# Patient Record
Sex: MALE | Race: WHITE | NOT HISPANIC OR LATINO | URBAN - METROPOLITAN AREA
[De-identification: names, ages, dates, MRNs, and addresses within clinical notes are randomized per-mention and may not be internally consistent; named-entity substitution may affect disease eponyms.]

---

## 2022-10-24 VITALS
HEART RATE: 81 BPM | SYSTOLIC BLOOD PRESSURE: 145 MMHG | DIASTOLIC BLOOD PRESSURE: 81 MMHG | RESPIRATION RATE: 18 BRPM | TEMPERATURE: 98 F | OXYGEN SATURATION: 94 %

## 2022-10-24 LAB
ALBUMIN SERPL ELPH-MCNC: 3.6 G/DL — SIGNIFICANT CHANGE UP (ref 3.3–5)
ALP SERPL-CCNC: 137 U/L — HIGH (ref 40–120)
ALT FLD-CCNC: 31 U/L — SIGNIFICANT CHANGE UP (ref 10–45)
ANION GAP SERPL CALC-SCNC: 16 MMOL/L — SIGNIFICANT CHANGE UP (ref 5–17)
APTT BLD: 29.1 SEC — SIGNIFICANT CHANGE UP (ref 27.5–35.5)
AST SERPL-CCNC: 38 U/L — SIGNIFICANT CHANGE UP (ref 10–40)
BASOPHILS # BLD AUTO: 0.01 K/UL — SIGNIFICANT CHANGE UP (ref 0–0.2)
BASOPHILS NFR BLD AUTO: 0.1 % — SIGNIFICANT CHANGE UP (ref 0–2)
BILIRUB SERPL-MCNC: 2 MG/DL — HIGH (ref 0.2–1.2)
BUN SERPL-MCNC: 15 MG/DL — SIGNIFICANT CHANGE UP (ref 7–23)
CALCIUM SERPL-MCNC: 7.8 MG/DL — LOW (ref 8.4–10.5)
CHLORIDE SERPL-SCNC: 105 MMOL/L — SIGNIFICANT CHANGE UP (ref 96–108)
CO2 SERPL-SCNC: 21 MMOL/L — LOW (ref 22–31)
CREAT SERPL-MCNC: 0.97 MG/DL — SIGNIFICANT CHANGE UP (ref 0.5–1.3)
EGFR: 73 ML/MIN/1.73M2 — SIGNIFICANT CHANGE UP
EOSINOPHIL # BLD AUTO: 0.01 K/UL — SIGNIFICANT CHANGE UP (ref 0–0.5)
EOSINOPHIL NFR BLD AUTO: 0.1 % — SIGNIFICANT CHANGE UP (ref 0–6)
GLUCOSE SERPL-MCNC: 142 MG/DL — HIGH (ref 70–99)
HCT VFR BLD CALC: 37.4 % — LOW (ref 39–50)
HGB BLD-MCNC: 12.5 G/DL — LOW (ref 13–17)
IMM GRANULOCYTES NFR BLD AUTO: 0.3 % — SIGNIFICANT CHANGE UP (ref 0–0.9)
INR BLD: 1.28 — HIGH (ref 0.88–1.16)
LACTATE SERPL-SCNC: 3.5 MMOL/L — HIGH (ref 0.5–2)
LYMPHOCYTES # BLD AUTO: 1.19 K/UL — SIGNIFICANT CHANGE UP (ref 1–3.3)
LYMPHOCYTES # BLD AUTO: 8.2 % — LOW (ref 13–44)
MCHC RBC-ENTMCNC: 29.3 PG — SIGNIFICANT CHANGE UP (ref 27–34)
MCHC RBC-ENTMCNC: 33.4 GM/DL — SIGNIFICANT CHANGE UP (ref 32–36)
MCV RBC AUTO: 87.6 FL — SIGNIFICANT CHANGE UP (ref 80–100)
MONOCYTES # BLD AUTO: 0.58 K/UL — SIGNIFICANT CHANGE UP (ref 0–0.9)
MONOCYTES NFR BLD AUTO: 4 % — SIGNIFICANT CHANGE UP (ref 2–14)
NEUTROPHILS # BLD AUTO: 12.67 K/UL — HIGH (ref 1.8–7.4)
NEUTROPHILS NFR BLD AUTO: 87.3 % — HIGH (ref 43–77)
NRBC # BLD: 0 /100 WBCS — SIGNIFICANT CHANGE UP (ref 0–0)
NT-PROBNP SERPL-SCNC: HIGH PG/ML (ref 0–300)
PLATELET # BLD AUTO: 215 K/UL — SIGNIFICANT CHANGE UP (ref 150–400)
POTASSIUM SERPL-MCNC: 3.4 MMOL/L — LOW (ref 3.5–5.3)
POTASSIUM SERPL-SCNC: 3.4 MMOL/L — LOW (ref 3.5–5.3)
PROT SERPL-MCNC: 6.6 G/DL — SIGNIFICANT CHANGE UP (ref 6–8.3)
PROTHROM AB SERPL-ACNC: 15.3 SEC — HIGH (ref 10.5–13.4)
RBC # BLD: 4.27 M/UL — SIGNIFICANT CHANGE UP (ref 4.2–5.8)
RBC # FLD: 16.1 % — HIGH (ref 10.3–14.5)
SARS-COV-2 RNA SPEC QL NAA+PROBE: NEGATIVE — SIGNIFICANT CHANGE UP
SODIUM SERPL-SCNC: 142 MMOL/L — SIGNIFICANT CHANGE UP (ref 135–145)
TROPONIN T SERPL-MCNC: 0.04 NG/ML — CRITICAL HIGH (ref 0–0.01)
WBC # BLD: 14.51 K/UL — HIGH (ref 3.8–10.5)
WBC # FLD AUTO: 14.51 K/UL — HIGH (ref 3.8–10.5)

## 2022-10-24 PROCEDURE — 70450 CT HEAD/BRAIN W/O DYE: CPT | Mod: 26,MC

## 2022-10-24 PROCEDURE — 71045 X-RAY EXAM CHEST 1 VIEW: CPT | Mod: 26

## 2022-10-24 PROCEDURE — 93010 ELECTROCARDIOGRAM REPORT: CPT

## 2022-10-24 PROCEDURE — 99291 CRITICAL CARE FIRST HOUR: CPT

## 2022-10-24 RX ORDER — AZITHROMYCIN 500 MG/1
500 TABLET, FILM COATED ORAL ONCE
Refills: 0 | Status: COMPLETED | OUTPATIENT
Start: 2022-10-24 | End: 2022-10-24

## 2022-10-24 RX ORDER — CEFTRIAXONE 500 MG/1
1000 INJECTION, POWDER, FOR SOLUTION INTRAMUSCULAR; INTRAVENOUS ONCE
Refills: 0 | Status: COMPLETED | OUTPATIENT
Start: 2022-10-24 | End: 2022-10-24

## 2022-10-24 RX ORDER — SODIUM CHLORIDE 9 MG/ML
1000 INJECTION INTRAMUSCULAR; INTRAVENOUS; SUBCUTANEOUS ONCE
Refills: 0 | Status: DISCONTINUED | OUTPATIENT
Start: 2022-10-24 | End: 2022-10-24

## 2022-10-24 RX ORDER — SODIUM CHLORIDE 9 MG/ML
1000 INJECTION INTRAMUSCULAR; INTRAVENOUS; SUBCUTANEOUS ONCE
Refills: 0 | Status: COMPLETED | OUTPATIENT
Start: 2022-10-24 | End: 2022-10-24

## 2022-10-24 RX ADMIN — SODIUM CHLORIDE 1000 MILLILITER(S): 9 INJECTION INTRAMUSCULAR; INTRAVENOUS; SUBCUTANEOUS at 23:14

## 2022-10-24 RX ADMIN — CEFTRIAXONE 100 MILLIGRAM(S): 500 INJECTION, POWDER, FOR SOLUTION INTRAMUSCULAR; INTRAVENOUS at 23:14

## 2022-10-24 NOTE — ED ADULT TRIAGE NOTE - ARRIVAL INFO ADDITIONAL COMMENTS
Pt to ED c/o AMS and possible syncope. Per pt's wife at bedside the patient was in bed "and he would not answer me then he was confused" Unknown duration. Pt A&OX3 at this time. Per EMS pt was hypoxic on arrival to home o2 sat in "low 90s" and placed on 4L nasal canula. Denies head trauma.

## 2022-10-24 NOTE — ED ADULT NURSE NOTE - OBJECTIVE STATEMENT
89 y/o M with pmhx HLD, HTN, and recent PNA one month ago requiring 5 day admission to [place] for IV abx presents to the ED via EMS for sudden onset worsening AMS just PTA. "He goes to bed early, when all of a sudden he started screaming. I ran into his room and and he wouldn't respond to me, was screaming, and was holding his breath and breathing weird. I called our doctor and they told us to call 911." Per EMS, pt 90% on RA and placed on 2LNC. On exam, pt not answering orientation questions and is 89 y/o M with pmhx HLD, HTN, and recent PNA one month ago requiring 5 day admission to [place] for IV abx presents to the ED via EMS for sudden onset worsening AMS just PTA. "He goes to bed early, when all of a sudden he started screaming. I ran into his room and and he wouldn't respond to me, was screaming, and was holding his breath and breathing weird. I called our doctor and they told us to call 911." Per EMS, pt 90% on RA and placed on 2LNC. On exam, pt not answering orientation questions and is saying only "I want to throw up. I want to go home." Respirations shallow. Lung sounds diminished throughout. Tachypnea. No retractions or accessory muscle use. VSS, afebrile. Radial pulses 2+ and equal bilaterally. DP pulses 1+ and equal bilaterally. Delayed cap refill to toes. No edema. Pallor. Small incised abscess under chin, no signs of infection, dressing clean, dry, and intact. Pt placed on the cardiac monitor. PIV placed. Labs sent. Awaiting CT and XR.

## 2022-10-24 NOTE — ED ADULT NURSE REASSESSMENT NOTE - NS ED NURSE REASSESS COMMENT FT1
Pt sterile straight cathed for purulent urine. Urine sent to lab. Blood cultures drawn. IV abx initiated after specimen collection.

## 2022-10-24 NOTE — ED ADULT NURSE NOTE - THROAT
external neck incised abscess is clean, dry intact dressing, wound bed with fat, no signs of infection

## 2022-10-25 ENCOUNTER — INPATIENT (INPATIENT)
Facility: HOSPITAL | Age: 87
LOS: 0 days | Discharge: ANOTHER HOSPITAL | DRG: 312 | End: 2022-10-25
Attending: INTERNAL MEDICINE | Admitting: INTERNAL MEDICINE
Payer: MEDICARE

## 2022-10-25 VITALS
OXYGEN SATURATION: 97 % | SYSTOLIC BLOOD PRESSURE: 155 MMHG | TEMPERATURE: 99 F | RESPIRATION RATE: 18 BRPM | DIASTOLIC BLOOD PRESSURE: 73 MMHG | HEART RATE: 81 BPM

## 2022-10-25 DIAGNOSIS — K08.409 PARTIAL LOSS OF TEETH, UNSPECIFIED CAUSE, UNSPECIFIED CLASS: Chronic | ICD-10-CM

## 2022-10-25 DIAGNOSIS — R79.89 OTHER SPECIFIED ABNORMAL FINDINGS OF BLOOD CHEMISTRY: ICD-10-CM

## 2022-10-25 DIAGNOSIS — R74.8 ABNORMAL LEVELS OF OTHER SERUM ENZYMES: ICD-10-CM

## 2022-10-25 DIAGNOSIS — R55 SYNCOPE AND COLLAPSE: ICD-10-CM

## 2022-10-25 DIAGNOSIS — N39.0 URINARY TRACT INFECTION, SITE NOT SPECIFIED: ICD-10-CM

## 2022-10-25 DIAGNOSIS — I48.91 UNSPECIFIED ATRIAL FIBRILLATION: ICD-10-CM

## 2022-10-25 DIAGNOSIS — Z98.890 OTHER SPECIFIED POSTPROCEDURAL STATES: Chronic | ICD-10-CM

## 2022-10-25 DIAGNOSIS — R77.8 OTHER SPECIFIED ABNORMALITIES OF PLASMA PROTEINS: ICD-10-CM

## 2022-10-25 DIAGNOSIS — J18.1 LOBAR PNEUMONIA, UNSPECIFIED ORGANISM: ICD-10-CM

## 2022-10-25 LAB
APPEARANCE UR: CLEAR — SIGNIFICANT CHANGE UP
APTT BLD: 136.2 SEC — CRITICAL HIGH (ref 27.5–35.5)
BACTERIA # UR AUTO: PRESENT /HPF
BILIRUB UR-MCNC: ABNORMAL
CK MB CFR SERPL CALC: 7 NG/ML — HIGH (ref 0–6.7)
CK SERPL-CCNC: 557 U/L — HIGH (ref 30–200)
COLOR SPEC: YELLOW — SIGNIFICANT CHANGE UP
DIFF PNL FLD: ABNORMAL
EPI CELLS # UR: SIGNIFICANT CHANGE UP /HPF (ref 0–5)
GLUCOSE UR QL: NEGATIVE — SIGNIFICANT CHANGE UP
HCT VFR BLD CALC: 39 % — SIGNIFICANT CHANGE UP (ref 39–50)
HGB BLD-MCNC: 12.5 G/DL — LOW (ref 13–17)
KETONES UR-MCNC: 15 MG/DL
LACTATE SERPL-SCNC: 1.6 MMOL/L — SIGNIFICANT CHANGE UP (ref 0.5–2)
LEUKOCYTE ESTERASE UR-ACNC: NEGATIVE — SIGNIFICANT CHANGE UP
MCHC RBC-ENTMCNC: 29 PG — SIGNIFICANT CHANGE UP (ref 27–34)
MCHC RBC-ENTMCNC: 32.1 GM/DL — SIGNIFICANT CHANGE UP (ref 32–36)
MCV RBC AUTO: 90.5 FL — SIGNIFICANT CHANGE UP (ref 80–100)
NITRITE UR-MCNC: POSITIVE
NRBC # BLD: 0 /100 WBCS — SIGNIFICANT CHANGE UP (ref 0–0)
PH UR: 6 — SIGNIFICANT CHANGE UP (ref 5–8)
PLATELET # BLD AUTO: 175 K/UL — SIGNIFICANT CHANGE UP (ref 150–400)
PROT UR-MCNC: 100 MG/DL
RBC # BLD: 4.31 M/UL — SIGNIFICANT CHANGE UP (ref 4.2–5.8)
RBC # FLD: 16.2 % — HIGH (ref 10.3–14.5)
RBC CASTS # UR COMP ASSIST: > 10 /HPF
SP GR SPEC: 1.02 — SIGNIFICANT CHANGE UP (ref 1–1.03)
TROPONIN T SERPL-MCNC: 0.09 NG/ML — CRITICAL HIGH (ref 0–0.01)
TROPONIN T SERPL-MCNC: 0.11 NG/ML — CRITICAL HIGH (ref 0–0.01)
UROBILINOGEN FLD QL: 1 E.U./DL — SIGNIFICANT CHANGE UP
WBC # BLD: 9.65 K/UL — SIGNIFICANT CHANGE UP (ref 3.8–10.5)
WBC # FLD AUTO: 9.65 K/UL — SIGNIFICANT CHANGE UP (ref 3.8–10.5)
WBC UR QL: ABNORMAL /HPF

## 2022-10-25 PROCEDURE — 83605 ASSAY OF LACTIC ACID: CPT

## 2022-10-25 PROCEDURE — 71045 X-RAY EXAM CHEST 1 VIEW: CPT

## 2022-10-25 PROCEDURE — 83880 ASSAY OF NATRIURETIC PEPTIDE: CPT

## 2022-10-25 PROCEDURE — 82550 ASSAY OF CK (CPK): CPT

## 2022-10-25 PROCEDURE — 87635 SARS-COV-2 COVID-19 AMP PRB: CPT

## 2022-10-25 PROCEDURE — 99285 EMERGENCY DEPT VISIT HI MDM: CPT

## 2022-10-25 PROCEDURE — 87040 BLOOD CULTURE FOR BACTERIA: CPT

## 2022-10-25 PROCEDURE — 81001 URINALYSIS AUTO W/SCOPE: CPT

## 2022-10-25 PROCEDURE — 85610 PROTHROMBIN TIME: CPT

## 2022-10-25 PROCEDURE — 96367 TX/PROPH/DG ADDL SEQ IV INF: CPT

## 2022-10-25 PROCEDURE — 85027 COMPLETE CBC AUTOMATED: CPT

## 2022-10-25 PROCEDURE — 84484 ASSAY OF TROPONIN QUANT: CPT

## 2022-10-25 PROCEDURE — 85730 THROMBOPLASTIN TIME PARTIAL: CPT

## 2022-10-25 PROCEDURE — 82553 CREATINE MB FRACTION: CPT

## 2022-10-25 PROCEDURE — 80053 COMPREHEN METABOLIC PANEL: CPT

## 2022-10-25 PROCEDURE — 70450 CT HEAD/BRAIN W/O DYE: CPT | Mod: MC

## 2022-10-25 PROCEDURE — 82962 GLUCOSE BLOOD TEST: CPT

## 2022-10-25 PROCEDURE — 93005 ELECTROCARDIOGRAM TRACING: CPT

## 2022-10-25 PROCEDURE — 85025 COMPLETE CBC W/AUTO DIFF WBC: CPT

## 2022-10-25 PROCEDURE — 96365 THER/PROPH/DIAG IV INF INIT: CPT

## 2022-10-25 PROCEDURE — 36415 COLL VENOUS BLD VENIPUNCTURE: CPT

## 2022-10-25 PROCEDURE — 99239 HOSP IP/OBS DSCHRG MGMT >30: CPT

## 2022-10-25 RX ORDER — HEPARIN SODIUM 5000 [USP'U]/ML
INJECTION INTRAVENOUS; SUBCUTANEOUS
Qty: 25000 | Refills: 0 | Status: DISCONTINUED | OUTPATIENT
Start: 2022-10-25 | End: 2022-10-25

## 2022-10-25 RX ORDER — CEFTRIAXONE 500 MG/1
1000 INJECTION, POWDER, FOR SOLUTION INTRAMUSCULAR; INTRAVENOUS EVERY 24 HOURS
Refills: 0 | Status: DISCONTINUED | OUTPATIENT
Start: 2022-10-25 | End: 2022-10-25

## 2022-10-25 RX ORDER — HEPARIN SODIUM 5000 [USP'U]/ML
6500 INJECTION INTRAVENOUS; SUBCUTANEOUS EVERY 6 HOURS
Refills: 0 | Status: DISCONTINUED | OUTPATIENT
Start: 2022-10-25 | End: 2022-10-25

## 2022-10-25 RX ORDER — AZITHROMYCIN 500 MG/1
500 TABLET, FILM COATED ORAL
Qty: 0 | Refills: 0 | DISCHARGE
Start: 2022-10-25

## 2022-10-25 RX ORDER — HEPARIN SODIUM 5000 [USP'U]/ML
3000 INJECTION INTRAVENOUS; SUBCUTANEOUS EVERY 6 HOURS
Refills: 0 | Status: DISCONTINUED | OUTPATIENT
Start: 2022-10-25 | End: 2022-10-25

## 2022-10-25 RX ORDER — AZITHROMYCIN 500 MG/1
500 TABLET, FILM COATED ORAL EVERY 24 HOURS
Refills: 0 | Status: DISCONTINUED | OUTPATIENT
Start: 2022-10-26 | End: 2022-10-25

## 2022-10-25 RX ORDER — CEFTRIAXONE 500 MG/1
1 INJECTION, POWDER, FOR SOLUTION INTRAMUSCULAR; INTRAVENOUS
Qty: 0 | Refills: 0 | DISCHARGE
Start: 2022-10-25

## 2022-10-25 RX ADMIN — AZITHROMYCIN 255 MILLIGRAM(S): 500 TABLET, FILM COATED ORAL at 00:36

## 2022-10-25 RX ADMIN — HEPARIN SODIUM 1500 UNIT(S)/HR: 5000 INJECTION INTRAVENOUS; SUBCUTANEOUS at 11:39

## 2022-10-25 RX ADMIN — HEPARIN SODIUM 1500 UNIT(S)/HR: 5000 INJECTION INTRAVENOUS; SUBCUTANEOUS at 04:00

## 2022-10-25 RX ADMIN — AZITHROMYCIN 500 MILLIGRAM(S): 500 TABLET, FILM COATED ORAL at 01:38

## 2022-10-25 RX ADMIN — CEFTRIAXONE 1000 MILLIGRAM(S): 500 INJECTION, POWDER, FOR SOLUTION INTRAMUSCULAR; INTRAVENOUS at 00:36

## 2022-10-25 RX ADMIN — SODIUM CHLORIDE 1000 MILLILITER(S): 9 INJECTION INTRAMUSCULAR; INTRAVENOUS; SUBCUTANEOUS at 01:38

## 2022-10-25 NOTE — ED PROVIDER NOTE - CARE PLAN
Principal Discharge DX:	Syncope  Secondary Diagnosis:	Atrial fibrillation  Secondary Diagnosis:	Pneumonia  Secondary Diagnosis:	Acute UTI   1

## 2022-10-25 NOTE — H&P ADULT - PROBLEM SELECTOR PLAN 1
Presenting complaint, history obtained makes event unclear. Pt found lethargic in bed, wife unsure if pt was conscious or unconscious. Pt seemed altered then returned to baseline in [facility] ED. EKG 74 bpm, RBB, t wave abnormalities. Trop 0.04, 0.11. BNP 26322.  -- Trend troponins  -- Orthostatics  -- Echo  -- Monitor on telemetry Presenting complaint, history obtained makes event unclear. Pt found lethargic in bed, wife unsure if pt was conscious or unconscious. Pt seemed altered then returned to baseline in [facility] ED. EKG 74 bpm, RBB, t wave abnormalities. Trop 0.04, 0.11. BNP 93510. CTH negative.   -- Trend troponins  -- Check orthostatics  -- Echo  -- Monitor on telemetry

## 2022-10-25 NOTE — DISCHARGE NOTE NURSING/CASE MANAGEMENT/SOCIAL WORK - NSDCPEFALRISK_GEN_ALL_CORE
For information on Fall & Injury Prevention, visit: [email or web address] OR  [email or web address] OR  https://www.cdc.gov/steadi/patient.html

## 2022-10-25 NOTE — ED ADULT NURSE REASSESSMENT NOTE - NS ED NURSE REASSESS COMMENT FT1
Pt lactate improved 3.5 > 1.6. Pt troponin elevated 0.04 >0.11. Work up positive for UTI and PNA. Pt now s/p ceftriaxone and azithromycin. Pt also with new onset AFib. Plan for admission. Pt lactate improved 3.5 > 1.6. Pt troponin elevated 0.04 >0.11. Work up positive for UTI and PNA. Pt now s/p ceftriaxone and azithromycin. Pt also with new onset AFib, rate controlled 70's. Plan for admission.

## 2022-10-25 NOTE — ED ADULT NURSE REASSESSMENT NOTE - NS ED NURSE REASSESS COMMENT FT1
Pt attempting to get OOB x2 stating "I need to pee!" ripping off tele and oxygen, PIV's intact. Urinal provided immediately both times but pt unable to urinate. Pt placed on 1:1 for safety. Pt did have prostate resistance during earlier straight cath insertion. Admitting PA notified that bladder scan is needed, that the ED does not have a bladder scanner, and that PA would need to come to ED to perform scan but declined to do so. Notably, pt oriented and now following commands. Pt is hard of hearing.

## 2022-10-25 NOTE — H&P ADULT - PROBLEM SELECTOR PLAN 6
-- RUQ US BNP 26542. CXR w/?vascular congestion.   -- Maintaining spO2 on RA  -- Assess need for diuresis in AM

## 2022-10-25 NOTE — ED PROVIDER NOTE - CLINICAL SUMMARY MEDICAL DECISION MAKING FREE TEXT BOX
89 y/o M pt presents to ED brought in by family for AMS. Pt had recent pneumonia and dental infection, and is increasingly lethargic and weak for the past several days. He was afebrile and tachycardic on arrival, and is currently tachypneic, hypoxic to 94%, labs with WC 15, lactate of 3.5, and trop & BNP mildly elevated. Pt covid negative, XR showed multi-focal pneumonia. 1L of fluids given, Ceftriaxone and Zithromax started, 30cc/kg not given secondary to elevated trop and BNP, will repeat lactate after 1L fluids and will admit pt for pneumonia and sepsis. 89 y/o M pt presents to ED brought in by family for AMS. Pt had recent pneumonia and dental infection, and is increasingly lethargic and weak for the past several days. He was afebrile and tachycardic on arrival, and is currently tachypneic, hypoxic to 94%, labs with WC 15, lactate of 3.5, and trop & BNP mildly elevated. Pt covid negative, XR showed multi-focal pneumonia. 1L of fluids given, Ceftriaxone and Zithromax started, 30cc/kg not given secondary to elevated trop and BNP, will repeat lactate after 1L fluids and will admit pt for pneumonia and sepsis.    Trop elevating from 0.4 to .11 and bnp elevated - xray pna vs fluid, pt requires admission for increasing trop, echo, IV antibiotics for PNA and UTI.  Discussed with fellow who agrees on admission.

## 2022-10-25 NOTE — H&P ADULT - ASSESSMENT
89 yo male, [place], w/ no known PMHx who presented to [facility] ED on 10/25 with his son in law and wife after an episode of lethargy/AMS. In the ED, the patient was found to have an elevated troponin, elevated BNP, abnormal CXR and UA suggestive of a UTI. He is now being admitted to [facility] for further workup.

## 2022-10-25 NOTE — ED PROVIDER NOTE - OBJECTIVE STATEMENT
89 y/o M pt with no pertinent PMHx and PSHx presents to ED with son in law and wife for failure to thrive. Pt had multiple admissions to [facility] for pneumonia over the past few weeks, and has had significant decline with decreased PO intake. He also had an abscess over his lower chin s/p surgery 10days ago, but is no longer on abx. Today he became altered for a brief, elevated time that was witnessed by his wife. Pt's family thinks he's more lethargic than usual, tachypneic, and is not acting right. Pt denies vomiting, diarrhea, or fever. He usually has all his work up at [facility]. Pt is able to answer some questions with me, saying he's not in any pain, just feels tired and fatigued. Pt recognizes his wife and son in law.

## 2022-10-25 NOTE — ED ADULT NURSE REASSESSMENT NOTE - NS ED NURSE REASSESS COMMENT FT1
Heparin gtt initiated @1,500units/hr per initiation protocol for 83.6kg @04:00am for elevated troponin. Initiation aPTT 29.1. Goal aPTT 58-99. Repeat aPTT @10:00am. Second PIV placed to right forearm.

## 2022-10-25 NOTE — H&P ADULT - NSHPLABSRESULTS_GEN_ALL_CORE
12.5   14.51 )-----------( 215      ( 24 Oct 2022 22:44 )             37.4       10-24    142  |  105  |  15  ----------------------------<  142<H>  3.4<L>   |  21<L>  |  0.97    Ca    7.8<L>      24 Oct 2022 22:44    TPro  6.6  /  Alb  3.6  /  TBili  2.0<H>  /  DBili  x   /  AST  38  /  ALT  31  /  AlkPhos  137<H>  10      PT/INR - ( 24 Oct 2022 22:44 )   PT: 15.3 sec;   INR: 1.28          PTT - ( 24 Oct 2022 22:44 )  PTT:29.1 sec    CARDIAC MARKERS ( 25 Oct 2022 01:14 )  x     / 0.11 ng/mL / x     / x     / x      CARDIAC MARKERS ( 24 Oct 2022 22:44 )  x     / 0.04 ng/mL / x     / x     / x            Urinalysis Basic - ( 24 Oct 2022 23:15 )    Color: Yellow / Appearance: Clear / S.025 / pH: x  Gluc: x / Ketone: 15 mg/dL  / Bili: Small / Urobili: 1.0 E.U./dL   Blood: x / Protein: 100 mg/dL / Nitrite: POSITIVE   Leuk Esterase: NEGATIVE / RBC: > 10 /HPF / WBC 5-10 /HPF   Sq Epi: x / Non Sq Epi: 0-5 /HPF / Bacteria: Present /HPF

## 2022-10-25 NOTE — H&P ADULT - NSICDXPASTSURGICALHX_GEN_ALL_CORE_FT
PAST SURGICAL HISTORY:  H/O drainage of abscess s/p I&D of chin abscess    S/P tooth extraction recent tooth extraction

## 2022-10-25 NOTE — H&P ADULT - PROBLEM SELECTOR PLAN 7
-- RUQ US    DVT Proph: Heparin gtt  Dispo: pt's family wants pt transferred to [facility] -- RUQ US    DVT Proph: Heparin gtt  Dispo: pt's family wants pt transferred to [facility].

## 2022-10-25 NOTE — DISCHARGE NOTE PROVIDER - NSDCCPCAREPLAN_GEN_ALL_CORE_FT
PRINCIPAL DISCHARGE DIAGNOSIS  Diagnosis: Syncope  Assessment and Plan of Treatment: You were admitted to the hospital because you had an epsiode of being unconsiousness. CT Scan of your brain was without acute bleeding. Your heart enzymes (markers of heart injury in the blood) was elevated. You were pending to have an echocardiogram (heart Ultrasound) prior discharge.         SECONDARY DISCHARGE DIAGNOSES  Diagnosis: Atrial fibrillation  Assessment and Plan of Treatment: You have an abnormal heart rhythm (arrhythmia) called atrial fibrillation. With this condition, the hearts 2 upper chambers (the atria) quiver rather than squeeze the blood out in a normal pattern. This leads to an irregular and sometimes rapid heartbeat. Atrial fibrillation is serious condition as it affects the heart’s ability to fill with blood as it should and blood clots may form, which increases the risk for stroke. You were started on a heparin drip. Please follow up with Dr. Zimmerman.       Diagnosis: Pneumonia  Assessment and Plan of Treatment: You had a chest xray done 10/24/2022 which showed bilateral opacities/right pleural effusion and was given 1 dose of Azithromycin.    Diagnosis: Acute UTI  Assessment and Plan of Treatment: Urinary tract infections, also called "UTIs," are infections that affect either the bladder or the kidneys. Bladder infections are more common than kidney infections. Bladder infections happen when bacteria get into the urethra and travel up into the bladder. Kidney infections happen when the bacteria travel even higher, up into the kidneys. The symptoms of a bladder infection include pain or a burning feeling when you urinate, the need to urinate often, the need to urinate suddenly or in a hurry, blood in the urine. Signs that an infection has spread to the kidneys include fever, back pain, or nausea/vomiting. If you develop these symptoms please seek medical attention. You were started on Ceftriaxone 1g daily for 3 days (10/25/2022-10/27/2022).        PRINCIPAL DISCHARGE DIAGNOSIS  Diagnosis: Syncope  Assessment and Plan of Treatment: You were admitted to the hospital because you had an epsiode of being unconsiousness. CT Scan of your brain was without acute bleeding. Your heart enzymes (markers of heart injury in the blood) was elevated. You were pending to have an echocardiogram (heart Ultrasound) prior transfer to [facility].         SECONDARY DISCHARGE DIAGNOSES  Diagnosis: Atrial fibrillation  Assessment and Plan of Treatment: You have an abnormal heart rhythm (arrhythmia) called atrial fibrillation. With this condition, the hearts 2 upper chambers (the atria) quiver rather than squeeze the blood out in a normal pattern. This leads to an irregular and sometimes rapid heartbeat. Atrial fibrillation is serious condition as it affects the heart’s ability to fill with blood as it should and blood clots may form, which increases the risk for stroke. You were breifly placed on a heparin drip, however due to discussion with Dr. Zimmerman, it have been disconutine. Please follow up with Dr. Zimmerman.       Diagnosis: Pneumonia  Assessment and Plan of Treatment: You had a chest xray done 10/24/2022 which showed bilateral opacities/right pleural effusion and was started on Azithromycin and Cextriaxone    Diagnosis: Acute UTI  Assessment and Plan of Treatment: Urinary tract infections, also called "UTIs," are infections that affect either the bladder or the kidneys. Bladder infections are more common than kidney infections. Bladder infections happen when bacteria get into the urethra and travel up into the bladder. Kidney infections happen when the bacteria travel even higher, up into the kidneys. The symptoms of a bladder infection include pain or a burning feeling when you urinate, the need to urinate often, the need to urinate suddenly or in a hurry, blood in the urine. Signs that an infection has spread to the kidneys include fever, back pain, or nausea/vomiting. If you develop these symptoms please seek medical attention. You were started on Ceftriaxone 1g daily for 3 days (10/25/2022-10/27/2022).

## 2022-10-25 NOTE — H&P ADULT - PROBLEM SELECTOR PLAN 5
BNP 56506. CXR w/?vascular congestion.   -- Maintaining spO2 on RA  -- Assess need for diuresis in AM CXR w/?? multifocal infiltrates. Pt endorsing cough, recent admissions to [facility] for PNA in setting of aspiration  -- Follow up official CXR read  -- Follow up procal  -- Treat w/ Ceftriaxone 1g and Azithromycin 500 mg

## 2022-10-25 NOTE — DISCHARGE NOTE NURSING/CASE MANAGEMENT/SOCIAL WORK - PATIENT PORTAL LINK FT
You can access the FollowMyHealth Patient Portal offered by [facility] by registering at the following website: [email or web address]. By joining [employer]’s FollowMyHealth portal, you will also be able to view your health information using other applications (apps) compatible with our system.

## 2022-10-25 NOTE — H&P ADULT - PROBLEM SELECTOR PLAN 3
CP free. EKG 74 bpm, RBB, t wave abnormalities.  -- Troponin 0.04 --> 0.11  -- Continue to trend trops  -- Repeat EKG in AM  -- Echo  -- Monitor on telemetry  -- NPO for ischemic eval CP free. EKG 74 bpm, RBB, tw abnormalities.  -- Troponin 0.04 --> 0.11  -- Continue to trend trops  -- Repeat EKG in AM  -- Echo  -- Monitor on telemetry  -- NPO for ischemic eval

## 2022-10-25 NOTE — DISCHARGE NOTE PROVIDER - HOSPITAL COURSE
89 yo male, [facility], with PMHx of Afib (unknown if on AC) and recent abscess over his lower chin s/p surgery x 10 days ago (no longer on abx) who presented to [facility] ED on 10/25 with his son in law and wife after an episode of lethargy/AMS. Per the pt's wife at bedside, the pt was found in bed with labored breathing and seemed to be unconscious. She was unable to wake him up until EMS arrived. She reports the pt has had recent admissions to [facility] for pneumonia and failure to thrive over the past few weeks. He has had significant decline with decreased PO intake. On his last admission, he was treated for high blood pressure though he was never given a formal diagnosis. Pt denies chest pain, sob, palpitations. Endorses a cough which he states is chronic in nature and mostly non-productive. Denies fevers, sweats, chills, n/v/d.     In ED: VS T 97.9 HR 81 /81 RR 18 spO2 94% 2LNC EKG Afib 74 bpm, LABS WBC 14.5 H/H 12.5/37.4 Lactate 3.5 --> 1.6 K 3.4 Alk Phos 137 Bili 2.0 Trop 0.04 --> 0.11 BNP 87757 UA: + Nitrites, +Bacteria, WBC 5-10, RBC >10, COVID negative, CXR 10/24/2022 showed cardiomegaly, thoracic aortic calcification and ectasia. Bilateral opacities/right pleural effusion. Thoracic spine degenerative changes, levoscoliosis. Pt being admitted to rule out acute coronary syndrome and syncope workup.     During his hospital course, troponin peaked at 0.11 and was started briefly on a heparin gtt, . Pending ECHO    89 yo male, [facility], with PMHx of Afib (unknown if on AC) and recent abscess over his lower chin s/p surgery x 10 days ago (no longer on abx) who presented to [facility] ED on 10/25 with his son in law and wife after an episode of lethargy/AMS. Per the pt's wife at bedside, the pt was found in bed with labored breathing and seemed to be unconscious. She was unable to wake him up until EMS arrived. She reports the pt has had recent admissions to [facility] for pneumonia and failure to thrive over the past few weeks. He has had significant decline with decreased PO intake. On his last admission, he was treated for high blood pressure though he was never given a formal diagnosis. Pt denies chest pain, sob, palpitations. Endorses a cough which he states is chronic in nature and mostly non-productive. Denies fevers, sweats, chills, n/v/d.     In ED: VS T 97.9 HR 81 /81 RR 18 spO2 94% 2LNC EKG Afib 74 bpm, LABS WBC 14.5 H/H 12.5/37.4 Lactate 3.5 --> 1.6 K 3.4 Alk Phos 137 Bili 2.0 Trop 0.04 --> 0.11 BNP 62109 UA: + Nitrites, +Bacteria, WBC 5-10, RBC >10, COVID negative, CXR 10/24/2022 showed cardiomegaly, thoracic aortic calcification and ectasia. Bilateral opacities/right pleural effusion. Thoracic spine degenerative changes, levoscoliosis. Pt being admitted to rule out acute coronary syndrome and syncope workup.     During his hospital course, troponin peaked at 0.11 and was started briefly on a heparin gtt. He was started on Ceftriaxone 1g daily q24hrs x 3 days for UTI and azithromycin 500mg q24hrs for possible multifocal infiltrates. Pending ECHO     After discussion with wife, Simran and son-in-law, Jesse Hall who is the board of trustees of [facility], and pt's PCP, Dr. Mo Zimmerman, they insisted to be transfer to [facility] for further management due to care have always been [facility].      Pt seen and examined at bedside this AM without any complaints or events overnight, VSS, labs and telemetry reviewed and pt stable for discharge as discussed with Dr. Pelayo. Pt has received appropriate discharge instructions, including medication regimen, and will be transfer under Mo Santos and Renny Santos from [facility] CCU.     Discharge medications: ASA 81mg QD, Plavix 75mg QD, ______________.     89 yo male, [facility], with PMHx of Afib (unknown if on AC) and recent abscess over his lower chin s/p surgery x 10 days ago (no longer on abx) who presented to [facility] ED on 10/25 with his son in law and wife after an episode of lethargy/AMS. Per the pt's wife at bedside, the pt was found in bed with labored breathing and seemed to be unconscious. She was unable to wake him up until EMS arrived. She reports the pt has had recent admissions to [facility] for pneumonia and failure to thrive over the past few weeks. He has had significant decline with decreased PO intake. On his last admission, he was treated for high blood pressure though he was never given a formal diagnosis. Pt denies chest pain, sob, palpitations. Endorses a cough which he states is chronic in nature and mostly non-productive. Denies fevers, sweats, chills, n/v/d.     In ED: VS T 97.9 HR 81 /81 RR 18 spO2 94% 2LNC EKG Afib 74 bpm, LABS WBC 14.5 H/H 12.5/37.4 Lactate 3.5 --> 1.6 K 3.4 Alk Phos 137 Bili 2.0 Trop 0.04 --> 0.11 BNP 43066 UA: + Nitrites, +Bacteria, WBC 5-10, RBC >10, COVID negative, CTH CXR 10/24/2022 showed cardiomegaly, thoracic aortic calcification and ectasia. Bilateral opacities/right pleural effusion. Thoracic spine degenerative changes, levoscoliosis. Pt being admitted to rule out acute coronary syndrome and syncope workup.     During his hospital course, troponin peaked at 0.11 and was started briefly on a heparin gtt. He was started on Ceftriaxone 1g daily q24hrs x 3 days for UTI and azithromycin 500mg q24hrs for possible multifocal infiltrates. Pending ECHO     After discussion with wife, Simran and son-in-law, Jesse Hall who is the board of trustees of [facility], and pt's PCP, Dr. Mo Zimmerman, they insisted to be transfer to [facility] for further management due to care have always been [facility].      Pt seen and examined at bedside this AM without any complaints or events overnight, VSS, labs and telemetry reviewed and pt stable for discharge as discussed with Dr. Pelayo. Pt has received appropriate discharge instructions, including medication regimen, and will be transfer under Mo Santos and Renny Santos from [facility] CCU.     Discharge medications: ASA 81mg QD, Plavix 75mg QD, ______________.     89 yo male, [facility], with recent abscess over his lower chin s/p surgery x 10 days ago (no longer on abx) who presented to [facility] ED on 10/25 with his son in law and wife after an episode of lethargy/AMS. Per the pt's wife at bedside, the pt was found in bed with labored breathing and seemed to be unconscious. She was unable to wake him up until EMS arrived. She reports the pt has had recent admissions to [facility] for pneumonia and failure to thrive over the past few weeks. He has had significant decline with decreased PO intake. On his last admission, he was treated for high blood pressure though he was never given a formal diagnosis. Pt denies chest pain, sob, palpitations. Endorses a cough which he states is chronic in nature and mostly non-productive. Denies fevers, sweats, chills, n/v/d.     In ED: VS T 97.9 HR 81 /81 RR 18 spO2 94% 2LNC EKG Afib 74 bpm, LABS WBC 14.5 H/H 12.5/37.4 Lactate 3.5 --> 1.6 K 3.4 Alk Phos 137 Bili 2.0 Trop 0.04 --> 0.11 BNP 20412 UA: + Nitrites, +Bacteria, WBC 5-10, RBC >10, COVID negative, CTH CXR 10/24/2022 showed cardiomegaly, thoracic aortic calcification and ectasia. Bilateral opacities/right pleural effusion. Thoracic spine degenerative changes, levoscoliosis. Pt being admitted to rule out acute coronary syndrome and syncope workup.     During his hospital course, troponin peaked at 0.11, CHADVASc score of possible 2 (no other known history that wife is aware of) and was started on a heparin gtt. He was started on Ceftriaxone 1g daily q24hrs x 3 days for UTI and azithromycin 500mg q24hrs for possible multifocal infiltrates. Pending ECHO     After discussion with wife, Simran and son-in-law, Jesse Hall who is the board of trustees of [facility], and pt's PCP, Dr. Mo Zimmerman, they insisted to be transfer to [facility] for further management due to care have always been [facility].      Pt seen and examined at bedside this AM without any complaints or events overnight, VSS, labs and telemetry reviewed and pt stable for discharge as discussed with Dr. Pelayo. Pt has received appropriate discharge instructions, including medication regimen, and will be transfer under Mo Santos and Renny Santos from [facility] CCU.     Discharge medications: heparin gtt, Cexiftriaxone 1g q24h and Azithromycin 500mg q24h    89 yo male, [place], PMHx of Afib (per Dr. Zimmerman, not on AC) and with recent abscess over his lower chin s/p surgery x 10 days ago (no longer on abx) who presented to [facility] ED on 10/25 with his son in law and wife after an episode of lethargy/AMS. Per the pt's wife at bedside, the pt was found in bed with labored breathing and seemed to be unconscious. She was unable to wake him up until EMS arrived. She reports the pt has had recent admissions to [facility] for pneumonia and failure to thrive over the past few weeks. He has had significant decline with decreased PO intake. On his last admission, he was treated for high blood pressure though he was never given a formal diagnosis. Pt denies chest pain, sob, palpitations. Endorses a cough which he states is chronic in nature and mostly non-productive. Denies fevers, sweats, chills, n/v/d.     In ED: VS T 97.9 HR 81 /81 RR 18 spO2 94% 2LNC EKG Afib 74 bpm, LABS WBC 14.5 H/H 12.5/37.4 Lactate 3.5 --> 1.6 K 3.4 Alk Phos 137 Bili 2.0 Trop 0.04 --> 0.11 BNP [id number] UA: + Nitrites, +Bacteria, WBC 5-10, RBC >10, COVID negative, CTH CXR 10/24/2022 showed cardiomegaly, thoracic aortic calcification and ectasia. Bilateral opacities/right pleural effusion. Thoracic spine degenerative changes, levoscoliosis. Pt being admitted to rule out acute coronary syndrome and syncope workup.     During his hospital course, troponin peaked at 0.11, CHADVASc score of possible 2 (no other known history that wife is aware of) and was started on a heparin gtt. He was started on Ceftriaxone 1g daily q24hrs x 3 days for UTI and azithromycin 500mg q24hrs for possible multifocal infiltrates. Pending ECHO     After discussion with wife, Simran and son-in-law, Jesse Hall who is the board of trustees of [facility], and pt's PCP, Dr. Mo Zimmerman, they insisted to be transfer to [facility] for further management due to care have always been [facility].      Pt seen and examined at bedside this AM without any complaints or events overnight, VSS, labs and telemetry reviewed and pt stable for discharge as discussed with Dr. Pelayo. Pt has received appropriate discharge instructions, including medication regimen, and will be transfer under Mo Santos and Renny Santos from [facility] CCU.     Discharge medications: heparin gtt, Cexiftriaxone 1g q24h and Azithromycin 500mg q24h    89 yo male, [place], PMHx of Afib (per Dr. Zimmerman, not on AC due to bleeding risk), h/o chronic recurrent pneumonia and with recent abscess over his lower chin s/p surgery x 10 days ago (no longer on abx) who presented to [facility] ED on 10/25 with his son in law and wife after an episode of lethargy/AMS. Per the pt's wife at bedside, the pt was found in bed with labored breathing and seemed to be unconscious. She was unable to wake him up until EMS arrived. She reports the pt has had recent admissions to [facility] for pneumonia and failure to thrive over the past few weeks. He has had significant decline with decreased PO intake. On his last admission, he was treated for high blood pressure though he was never given a formal diagnosis. Pt denies chest pain, sob, palpitations. Endorses a cough which he states is chronic in nature and mostly non-productive. Denies fevers, sweats, chills, n/v/d.     In ED: VS T 97.9 HR 81 /81 RR 18 spO2 94% 2LNC EKG Afib 74 bpm, LABS WBC 14.5 H/H 12.5/37.4 Lactate 3.5 --> 1.6 K 3.4 Alk Phos 137 Bili 2.0 Trop 0.04 --> 0.11 BNP 74358 UA: + Nitrites, +Bacteria, WBC 5-10, RBC >10, COVID negative, CTH CXR 10/24/2022 showed cardiomegaly, thoracic aortic calcification and ectasia. Bilateral opacities/right pleural effusion. Thoracic spine degenerative changes, levoscoliosis. Pt being admitted to rule out acute coronary syndrome and syncope workup.     During his hospital course, troponin peaked at 0.11, was briefly placed on a heparin gtt, now dc'd due to bleeding risk. He was started on Ceftriaxone 1g daily q24hrs x 3 days for UTI and azithromycin 500mg q24hrs for CXR finding and a h/o recurrent pneumonia. Pending ECHO     After discussion with wife, Simran and son-in-law, Jesse Hall who is the board of trustees of [facility], and pt's PCP, Dr. Mo Zimmerman, they insisted to be transfer to [facility] for further management due to care have always been [facility].      Pt seen and examined at bedside this AM without any complaints or events overnight, VSS, labs and telemetry reviewed and pt stable for discharge as discussed with Dr. Pelayo. Pt has received appropriate discharge instructions, including medication regimen, and will be transfer under Mo Santos and Renny Santos from [facility] CCU.     Discharge medications: heparin gtt, Cexiftriaxone 1g q24h and Azithromycin 500mg q24h

## 2022-10-25 NOTE — H&P ADULT - PROBLEM SELECTOR PLAN 2
No prior hx of afib. [id number] of 3.   -- Rate controlled  -- Will start on heparin gtt   -- Monitor on tele

## 2022-10-25 NOTE — H&P ADULT - HISTORY OF PRESENT ILLNESS
89 yo male w/ no pertinent PMHx and PSHx who presented to [facility] ED on 10/25 with his son in law and wife after a near syncopal episode. Pt w/ recent admissions to [facility] for pneumonia over the past few weeks and failure to thrive. Per family at bedside, patient has had significant decline with decreased PO intake.       He also had an abscess over his lower chin s/p surgery 10days ago, but is no longer on abx. Today he became altered for a brief, elevated time that was witnessed by his wife. Pt's family thinks he's more lethargic than usual, tachypneic, and is not acting right. Pt denies vomiting, diarrhea, or fever. He usually has all his work up at [facility]. Pt is able to answer some questions with me, saying he's not in any pain, just feels tired and fatigued.     In ED: VS T 97.9 HR 81 /81 RR 18 spO2 94% 2LNC EKG LABS WBC 14.5 H/H 12.5/37.4 Lactate 3.5 --> 1.6 K 3.4 Alk Phos 137 Bili 2.0 Trop 0.04 --. 0.11 BNP 22233 UA: + Nitrites, +Bacteria, WBC 5-10, RBC >10, IMAGING: CXR w/? bilateral infiltrates vascular congestion vs consolidation, official read pending     . **History obtained from pt's wife**    89 yo male, [place], w/ no known PMHx who presented to [facility] ED on 10/25 with his son in law and wife after an episode of lethargy/AMS. Per the pt's wife, the pt was found in bed with labored breathing and seemed to be unconscious. She was unable to wake him up until EMS arrived. She reports the pt has had recent admissions to [facility] for pneumonia and failure to thrive over the past few weeks. He has had significant decline with decreased PO intake. On his last admission, he was treated for high blood pressure though he was never given a formal diagnosis. Pt denies chest pain, sob, palpitations. Endorses a cough which he states is chronic in nature and mostly non-productive. Denies fevers, sweats, chills, n/v/d.     In ED: VS T 97.9 HR 81 /81 RR 18 spO2 94% 2LNC EKG LABS WBC 14.5 H/H 12.5/37.4 Lactate 3.5 --> 1.6 K 3.4 Alk Phos 137 Bili 2.0 Trop 0.04 --> 0.11 BNP 49743 UA: + Nitrites, +Bacteria, WBC 5-10, RBC >10, IMAGING: CXR w/? bilateral infiltrates vascular congestion vs consolidation, official read pending.     Patient being admitted to rule out acute coronary syndrome.    **History obtained from pt's wife**    89 yo male, [place], w/ no known PMHx who presented to [facility] ED on 10/25 with his son in law and wife after an episode of lethargy/AMS. Per the pt's wife, the pt was found in bed with labored breathing and seemed to be unconscious. She was unable to wake him up until EMS arrived. She reports the pt has had recent admissions to [facility] for pneumonia and failure to thrive over the past few weeks. He has had significant decline with decreased PO intake. On his last admission, he was treated for high blood pressure though he was never given a formal diagnosis. Pt denies chest pain, sob, palpitations. Endorses a cough which he states is chronic in nature and mostly non-productive. Denies fevers, sweats, chills, n/v/d.     In ED: VS T 97.9 HR 81 /81 RR 18 spO2 94% 2LNC EKG Afib 74 bpm, LABS WBC 14.5 H/H 12.5/37.4 Lactate 3.5 --> 1.6 K 3.4 Alk Phos 137 Bili 2.0 Trop 0.04 --> 0.11 BNP 19943 UA: + Nitrites, +Bacteria, WBC 5-10, RBC >10, IMAGING: CXR w/? bilateral infiltrates vascular congestion vs consolidation, official read pending. CTH No ICH     Patient being admitted to rule out acute coronary syndrome.

## 2022-10-25 NOTE — H&P ADULT - PROBLEM SELECTOR PROBLEM 5
Alkaline phosphatase elevation Elevated brain natriuretic peptide (BNP) level Multifocal lung consolidation

## 2022-10-25 NOTE — H&P ADULT - PROBLEM SELECTOR PLAN 4
UA + Nitrite +Bacteria WBC 5-10  -- s/p IV Ceftriaxone 1 g in ED  -- Continue w/ CTX 1 g QD  -- Follow up BCX and UCx UA: + Nitrite +Bacteria WBC 5-10.  -- Afebrile. WBC 14.51  -- s/p IV Ceftriaxone 1 g in ED  -- Continue w/ CTX 1 g QD  -- Follow up BCX and UCx

## 2022-10-30 LAB
CULTURE RESULTS: SIGNIFICANT CHANGE UP
CULTURE RESULTS: SIGNIFICANT CHANGE UP
SPECIMEN SOURCE: SIGNIFICANT CHANGE UP
SPECIMEN SOURCE: SIGNIFICANT CHANGE UP

## 2022-10-31 DIAGNOSIS — N39.0 URINARY TRACT INFECTION, SITE NOT SPECIFIED: ICD-10-CM

## 2022-10-31 DIAGNOSIS — R74.8 ABNORMAL LEVELS OF OTHER SERUM ENZYMES: ICD-10-CM

## 2022-10-31 DIAGNOSIS — J18.9 PNEUMONIA, UNSPECIFIED ORGANISM: ICD-10-CM

## 2022-10-31 DIAGNOSIS — R55 SYNCOPE AND COLLAPSE: ICD-10-CM

## 2022-10-31 DIAGNOSIS — R09.02 HYPOXEMIA: ICD-10-CM

## 2022-10-31 DIAGNOSIS — R77.8 OTHER SPECIFIED ABNORMALITIES OF PLASMA PROTEINS: ICD-10-CM

## 2022-10-31 DIAGNOSIS — R79.89 OTHER SPECIFIED ABNORMAL FINDINGS OF BLOOD CHEMISTRY: ICD-10-CM

## 2022-10-31 DIAGNOSIS — I48.91 UNSPECIFIED ATRIAL FIBRILLATION: ICD-10-CM

## 2022-10-31 DIAGNOSIS — H91.90 UNSPECIFIED HEARING LOSS, UNSPECIFIED EAR: ICD-10-CM

## 2024-09-18 NOTE — ED PROVIDER NOTE - CROS ED CONS ALL NEG
Azelastine 0.1% Nasal spray    Last visit 03-05-24  Has CPX scheduled for 11-21-24 Dr Elodia Lawrence    [facility] in [place]   - - -